# Patient Record
Sex: MALE | Race: WHITE
[De-identification: names, ages, dates, MRNs, and addresses within clinical notes are randomized per-mention and may not be internally consistent; named-entity substitution may affect disease eponyms.]

---

## 2021-05-28 ENCOUNTER — HOSPITAL ENCOUNTER (INPATIENT)
Dept: HOSPITAL 47 - EC | Age: 66
LOS: 6 days | Discharge: HOSPICE | DRG: 178 | End: 2021-06-03
Attending: HOSPITALIST | Admitting: HOSPITALIST
Payer: MEDICARE

## 2021-05-28 VITALS — BODY MASS INDEX: 23.6 KG/M2

## 2021-05-28 DIAGNOSIS — E03.9: ICD-10-CM

## 2021-05-28 DIAGNOSIS — E87.1: ICD-10-CM

## 2021-05-28 DIAGNOSIS — F02.80: ICD-10-CM

## 2021-05-28 DIAGNOSIS — F20.9: ICD-10-CM

## 2021-05-28 DIAGNOSIS — E87.2: ICD-10-CM

## 2021-05-28 DIAGNOSIS — Z51.5: ICD-10-CM

## 2021-05-28 DIAGNOSIS — E86.0: ICD-10-CM

## 2021-05-28 DIAGNOSIS — G40.909: ICD-10-CM

## 2021-05-28 DIAGNOSIS — D64.9: ICD-10-CM

## 2021-05-28 DIAGNOSIS — G20: ICD-10-CM

## 2021-05-28 DIAGNOSIS — U07.1: Primary | ICD-10-CM

## 2021-05-28 LAB
ALBUMIN SERPL-MCNC: 3.5 G/DL (ref 3.8–4.9)
ALBUMIN/GLOB SERPL: 1.67 G/DL (ref 1.6–3.17)
ALP SERPL-CCNC: 89 U/L (ref 41–126)
ALT SERPL-CCNC: 24 U/L (ref 10–49)
ANION GAP SERPL CALC-SCNC: 8.8 MMOL/L (ref 4–12)
AST SERPL-CCNC: 37 U/L (ref 14–35)
BASOPHILS # BLD AUTO: 0.01 X 10*3/UL (ref 0–0.1)
BASOPHILS NFR BLD AUTO: 0.3 %
BUN SERPL-SCNC: 8 MG/DL (ref 9–27)
BUN/CREAT SERPL: 11.43 RATIO (ref 12–20)
CALCIUM SPEC-MCNC: 8.4 MG/DL (ref 8.7–10.3)
CHLORIDE SERPL-SCNC: 106 MMOL/L (ref 96–109)
CO2 SERPL-SCNC: 23.2 MMOL/L (ref 21.6–31.8)
EOSINOPHIL # BLD AUTO: 0.01 X 10*3/UL (ref 0.04–0.35)
EOSINOPHIL NFR BLD AUTO: 0.3 %
ERYTHROCYTE [DISTWIDTH] IN BLOOD BY AUTOMATED COUNT: 3.89 X 10*6/UL (ref 4.4–5.6)
ERYTHROCYTE [DISTWIDTH] IN BLOOD: 12.6 % (ref 11.5–14.5)
GLOBULIN SER CALC-MCNC: 2.1 G/DL (ref 1.6–3.3)
GLUCOSE SERPL-MCNC: 99 MG/DL (ref 70–110)
HCT VFR BLD AUTO: 35.9 % (ref 39.6–50)
HGB BLD-MCNC: 11.6 G/DL (ref 13–17)
LYMPHOCYTES # SPEC AUTO: 1.06 X 10*3/UL (ref 0.9–5)
LYMPHOCYTES NFR SPEC AUTO: 33.1 %
MCH RBC QN AUTO: 29.8 PG (ref 27–32)
MCHC RBC AUTO-ENTMCNC: 32.3 G/DL (ref 32–37)
MCV RBC AUTO: 92.3 FL (ref 80–97)
MONOCYTES # BLD AUTO: 0.4 X 10*3/UL (ref 0.2–1)
MONOCYTES NFR BLD AUTO: 12.5 %
NEUTROPHILS # BLD AUTO: 1.71 X 10*3/UL (ref 1.8–7.7)
NEUTROPHILS NFR BLD AUTO: 53.5 %
PLATELET # BLD AUTO: 149 X 10*3/UL (ref 140–440)
POTASSIUM SERPL-SCNC: 4.2 MMOL/L (ref 3.5–5.5)
PROT SERPL-MCNC: 5.6 G/DL (ref 6.2–8.2)
SODIUM SERPL-SCNC: 138 MMOL/L (ref 135–145)
WBC # BLD AUTO: 3.2 X 10*3/UL (ref 4.5–10)

## 2021-05-28 PROCEDURE — 99285 EMERGENCY DEPT VISIT HI MDM: CPT

## 2021-05-28 PROCEDURE — 80048 BASIC METABOLIC PNL TOTAL CA: CPT

## 2021-05-28 PROCEDURE — 85025 COMPLETE CBC W/AUTO DIFF WBC: CPT

## 2021-05-28 PROCEDURE — 86140 C-REACTIVE PROTEIN: CPT

## 2021-05-28 PROCEDURE — 83615 LACTATE (LD) (LDH) ENZYME: CPT

## 2021-05-28 PROCEDURE — 87635 SARS-COV-2 COVID-19 AMP PRB: CPT

## 2021-05-28 PROCEDURE — 70450 CT HEAD/BRAIN W/O DYE: CPT

## 2021-05-28 PROCEDURE — 85027 COMPLETE CBC AUTOMATED: CPT

## 2021-05-28 PROCEDURE — 80053 COMPREHEN METABOLIC PANEL: CPT

## 2021-05-28 PROCEDURE — 85379 FIBRIN DEGRADATION QUANT: CPT

## 2021-05-28 RX ADMIN — FAMOTIDINE SCH MG: 20 TABLET, FILM COATED ORAL at 20:25

## 2021-05-28 RX ADMIN — FAMOTIDINE SCH MG: 20 TABLET, FILM COATED ORAL at 09:20

## 2021-05-28 RX ADMIN — LACOSAMIDE SCH MG: 50 TABLET, FILM COATED ORAL at 09:20

## 2021-05-28 RX ADMIN — LACOSAMIDE SCH MG: 50 TABLET, FILM COATED ORAL at 20:25

## 2021-05-28 RX ADMIN — LEVOTHYROXINE SODIUM SCH MCG: 50 TABLET ORAL at 09:22

## 2021-05-28 NOTE — P.HPIM
History of Present Illness


H&P Date: 05/28/21


Chief Complaint: Generalized weakness





65-year-old male with seizures, Parkinson disease, schizophrenia and dementia





Patient is adult foster care house resident, he was found to have decreased by 

mouth intake over the past couple days with increased generalized weakness at b

aseline he is able to stand up and walk without assistance over the past 2 days 

he is requiring increased assistance and today he was requiring 2 person 

assistance this is per the [facility] house definitely a change from his baseline no 

report of fevers or chills no reports of trouble breathing or upper respiratory 

infection symptoms no report of GI bleeding or changes in bowel or urinary 

habits however they are reporting decreased by mouth intake, no report of recent

seizure activity





Patient was sent to [facility] where he was evaluated found to have 

hyponatremia, lactic acidosis was within normal limits, pro calcitonin was 

slightly elevated at 0.12, he tested positive for coated and he was also found 

to have mild anemia.  Chest x-ray showed no acute pathology soft for chronic 

changes, CT of the head showed chronic changes no acute pathology, EKG showed 

normal sinus rhythm





Patient was transferred to our facility for further care as the adult foster 

care house refuses to take him back due to testing positive for covid





Patient otherwise unable to provide any meaningful history at this time he only 

mumbles incomprehensible words





Review of Systems


ROS unobtainable: due to mental status





Past Medical History


Past Medical History: Dementia, Seizure Disorder


Additional Past Medical History / Comment(s): parkinson,


History of Any Multi-Drug Resistant Organisms: None Reported


Past Surgical History: Orthopedic Surgery


Past Psychological History: Unable to Obtain


Smoking Status: Unknown if ever smoked


Past Alcohol Use History: None Reported


Past Drug Use History: None Reported





Medications and Allergies


                                    Allergies











Allergy/AdvReac Type Severity Reaction Status Date / Time


 


No Known Allergies Allergy   Verified 05/28/21 00:36














Physical Exam


Vitals: 


                                   Vital Signs











  Temp Pulse Resp BP Pulse Ox


 


 05/28/21 00:25  97.7 F  54 L  16  109/72  100








                                Intake and Output











 05/27/21 05/27/21 05/28/21





 14:59 22:59 06:59


 


Other:   


 


  Weight   66.224 kg














Constitutional:          No acute distress, pleasant and cooperative however he 

only mumbles incomprehensible words


Eyes:      Anicteric sclerae, moist conjunctiva, 


         Pupils equal round reactive to light





ENMT:      NC/AT


         Oropharynx clear, no erythema, or exudates





Neck:      Supple, FROM, no masses, or JVD


         No carotid bruits


         No thyromegaly





Lungs:      Clear to auscultation


         Clear to percussion


         Normal respiratory effort, no accessory muscle use 





Cardiovascular:      Heart regular in rate and rhythm, 


         No murmurs, gallops, or rubs


         No peripheral edema





Abdominal:       Soft


         Nontender, no guarding, rebound or rigidity


         Abdomen moving with respiration


         Normoactive bowel sounds


         No hepatomegaly, No splenomegaly


         No palpable mass 


         No abdominal wall hernia noted 





Skin:      Normal temperature, tone, texture, turgor


         No induration


         No subcutaneous nodules 


         No rash, lesions


         No ulcers





Extremities:      No digital cyanosis 


         No clubbing


         Pedal pulses intact and symmetrical


         Radial pulses intact and symmetrical 


         No calf tenderness 





Psychiatric:      Alert and oriented to person, place  


          


      


Neuro      Muscles Strength 5/5 in all 4 extremities 


         Sensation to light touch grossly present throughout


         Cranial nerves II-XII grossly intact


         No focal sensory deficits


Lymphatics:       no palpable cervical or supraclavicular , or inguinal lymph 

nodes  





Assessment and Plan


Assessment: 





Hyponatremia and dehydration secondary to decreased by mouth intake


\History of seizure


Covid positive


Advanced dementia and Parkinson


Mild anemia no reported GI bleeding





Plan


IV fluid hydration with normal saline


Monitor electrolytes


Fall and seizure precautions


Supportive care


Supplemental oxygen if needed


Monitor hemoglobin








Verify home medications





PT/OT eval


 


CODE STATUS: Full code


DVT prophylaxis: Heparin subcu 3 times a day


Discussed with: Patient, ER


Anticipated length of stay  < than 2 midnights


Anticipated discharge place: [facility]


A total of 60 minutes was spent on the care of this complex patient more than 

50% of the time was spent in counseling and care coordination.

## 2021-05-28 NOTE — P.PN
Progress Note - Text


Progress Note Date: 05/28/21





Patient seen and evaluated independently by me today, I agree with the 

assessment and plan as documented by my colleague earlier today with no changes.

## 2021-05-28 NOTE — ED
Weakness HPI





- General


Stated complaint: Covid


Time Seen by Provider: 05/28/21 00:30


Source: patient, EMS


Mode of arrival: EMS


Limitations: no limitations





- History of Present Illness


Initial comments: 





This patient is a 65-year-old man who is transferred here from [facility].  Patient had been transferred there from his long-term care facility 

as he was weaker than usual.  They worked the patient up there he was found to 

have tested positive for covid 19 infection.  He also was hyponatremic with a 

sodium 129.  When I interview the patient, no complaints of pain or dyspnea.


MD Complaint: generalized weakness, lack of energy, difficulty walking


-: days(s)


Location: generalized


Improves with: none


Worsens with: none





- Related Data


                                    Allergies











Allergy/AdvReac Type Severity Reaction Status Date / Time


 


No Known Allergies Allergy   Verified 05/28/21 00:36














Review of Systems


ROS Statement: 


Those systems with pertinent positive or pertinent negative responses have been 

documented in the HPI.





ROS Other: All systems not noted in ROS Statement are negative.


Constitutional: Denies: fever


Respiratory: Denies: cough, dyspnea


Cardiovascular: Denies: chest pain


Gastrointestinal: Denies: abdominal pain, vomiting, diarrhea


Genitourinary: Denies: dysuria


Musculoskeletal: Denies: back pain


Skin: Denies: rash


Neurological: Denies: headache, weakness





Past Medical History


Past Medical History: Dementia, Seizure Disorder


Additional Past Medical History / Comment(s): parkinson,


History of Any Multi-Drug Resistant Organisms: None Reported


Past Surgical History: Orthopedic Surgery


Past Psychological History: Unable to Obtain


Smoking Status: Unknown if ever smoked


Past Alcohol Use History: None Reported


Past Drug Use History: None Reported





General Exam


Limitations: no limitations


General appearance: alert, in no apparent distress


Head exam: Present: atraumatic, normocephalic


Eye exam: Present: normal appearance.  Absent: scleral icterus, conjunctival 

injection


ENT exam: Present: mucous membranes dry


Neck exam: Present: normal inspection


Respiratory exam: Present: rales.  Absent: respiratory distress, wheezes, 

rhonchi, stridor, chest wall tenderness, accessory muscle use, decreased breath 

sounds, prolonged expiratory


Cardiovascular Exam: Present: bradycardia, normal heart sounds.  Absent: systo

lic murmur, diastolic murmur, rubs, gallop


GI/Abdominal exam: Present: soft.  Absent: distended, tenderness, guarding, 

rebound, rigid, mass


Extremities exam: Present: normal inspection, normal capillary refill.  Absent: 

pedal edema, calf tenderness


Back exam: Present: normal inspection


Neurological exam: Present: alert.  Absent: motor sensory deficit


Skin exam: Present: warm, dry, intact, normal color.  Absent: rash





Course


                                   Vital Signs











  05/28/21





  00:25


 


Temperature 97.7 F


 


Pulse Rate 54 L


 


Respiratory 16





Rate 


 


Blood Pressure 109/72


 


O2 Sat by Pulse 100





Oximetry 














Disposition


Clinical Impression: 


 COVID-19, Hyponatremia, Generalized weakness





Disposition: ADMITTED AS IP TO THIS HOSP


Condition: Fair


Is patient prescribed a controlled substance at d/c from ED?: No


Referrals: 


Mark Major MD [Primary Care Provider] - 1-2 days

## 2021-05-29 LAB
ANION GAP SERPL CALC-SCNC: 3 MMOL/L
BASOPHILS # BLD AUTO: 0 K/UL (ref 0–0.2)
BASOPHILS NFR BLD AUTO: 0 %
BUN SERPL-SCNC: 11 MG/DL (ref 9–20)
CALCIUM SPEC-MCNC: 8.4 MG/DL (ref 8.4–10.2)
CHLORIDE SERPL-SCNC: 99 MMOL/L (ref 98–107)
CO2 SERPL-SCNC: 30 MMOL/L (ref 22–30)
EOSINOPHIL # BLD AUTO: 0 K/UL (ref 0–0.7)
EOSINOPHIL NFR BLD AUTO: 0 %
ERYTHROCYTE [DISTWIDTH] IN BLOOD BY AUTOMATED COUNT: 3.95 M/UL (ref 4.3–5.9)
ERYTHROCYTE [DISTWIDTH] IN BLOOD: 12.5 % (ref 11.5–15.5)
GLUCOSE SERPL-MCNC: 91 MG/DL (ref 74–99)
HCT VFR BLD AUTO: 35.2 % (ref 39–53)
HGB BLD-MCNC: 11.9 GM/DL (ref 13–17.5)
LYMPHOCYTES # SPEC AUTO: 0.8 K/UL (ref 1–4.8)
LYMPHOCYTES NFR SPEC AUTO: 25 %
MCH RBC QN AUTO: 30.1 PG (ref 25–35)
MCHC RBC AUTO-ENTMCNC: 33.7 G/DL (ref 31–37)
MCV RBC AUTO: 89.2 FL (ref 80–100)
MONOCYTES # BLD AUTO: 0.3 K/UL (ref 0–1)
MONOCYTES NFR BLD AUTO: 10 %
NEUTROPHILS # BLD AUTO: 2 K/UL (ref 1.3–7.7)
NEUTROPHILS NFR BLD AUTO: 63 %
PLATELET # BLD AUTO: 171 K/UL (ref 150–450)
POTASSIUM SERPL-SCNC: 4.4 MMOL/L (ref 3.5–5.1)
SODIUM SERPL-SCNC: 132 MMOL/L (ref 137–145)
WBC # BLD AUTO: 3.1 K/UL (ref 3.8–10.6)

## 2021-05-29 RX ADMIN — BETHANECHOL CHLORIDE SCH MG: 10 TABLET ORAL at 16:11

## 2021-05-29 RX ADMIN — LACOSAMIDE SCH MG: 50 TABLET, FILM COATED ORAL at 10:06

## 2021-05-29 RX ADMIN — LACOSAMIDE SCH MG: 50 TABLET, FILM COATED ORAL at 20:46

## 2021-05-29 RX ADMIN — FAMOTIDINE SCH MG: 20 TABLET, FILM COATED ORAL at 10:06

## 2021-05-29 RX ADMIN — BETHANECHOL CHLORIDE SCH MG: 10 TABLET ORAL at 20:45

## 2021-05-29 RX ADMIN — FAMOTIDINE SCH MG: 20 TABLET, FILM COATED ORAL at 20:45

## 2021-05-29 RX ADMIN — Medication SCH MG: at 20:46

## 2021-05-29 RX ADMIN — LEVOTHYROXINE SODIUM SCH MCG: 50 TABLET ORAL at 06:11

## 2021-05-29 RX ADMIN — BENZTROPINE MESYLATE SCH MG: 1 TABLET ORAL at 20:45

## 2021-05-29 NOTE — P.PN
Subjective


Progress Note Date: 05/29/21





No new complaints today, pending discharge.





Objective





- Vital Signs


Vital signs: 


                                   Vital Signs











Temp  98.4 F   05/29/21 10:00


 


Pulse  95   05/29/21 10:00


 


Resp  16   05/29/21 10:00


 


BP  104/65   05/29/21 10:00


 


Pulse Ox  98   05/29/21 10:00








                                 Intake & Output











 05/28/21 05/29/21 05/29/21





 18:59 06:59 18:59


 


Output Total 300 300 


 


Balance -300 -300 


 


Output:   


 


  Urine 300 300 


 


Other:   


 


  Voiding Method Diaper Diaper Diaper





 Incontinent Incontinent Incontinent


 


  # Voids 1 1 














- Exam





Gen: awake, alert


HEENT: normocephalic, atraumatic, good hearing acuity, moist mucous membranes


Resp: good air exchange, breathing comfortably with no accessory muscle use


CVS: good distal perfusion x 4,


GI: soft, NTTP, ND


: no SPT, no CVAT, kwon catheter not present


MSK: no pitting edema, no clubbing


Neuro: non-focal, moving all extremities


Psych: cooperative, euthymic mood





- Labs


CBC & Chem 7: 


                                 05/29/21 08:20





                                 05/29/21 08:20


Labs: 


                  Abnormal Lab Results - Last 24 Hours (Table)











  05/28/21 05/29/21 05/29/21 Range/Units





  18:00 08:20 08:20 


 


WBC   3.1 L   (3.8-10.6)  k/uL


 


RBC   3.95 L   (4.30-5.90)  m/uL


 


Hgb   11.9 L   (13.0-17.5)  gm/dL


 


Hct   35.2 L   (39.0-53.0)  %


 


Lymphocytes #   0.8 L   (1.0-4.8)  k/uL


 


Sodium    132 L  (137-145)  mmol/L


 


Coronavirus (PCR)  Detected A    (Not Detectd)  














Assessment and Plan


Assessment: 





Hyponatremia and dehydration secondary to decreased by mouth intake


\History of seizure


Covid positive


Advanced dementia and Parkinson


Mild anemia no reported GI bleeding





Plan


IV fluid hydration with normal saline


Monitor electrolytes


Fall and seizure precautions


Supportive care


Supplemental oxygen if needed


Monitor hemoglobin


Plan is for patient to return to [facility] home with hospice.








Home meds reviewed and reconciled.





PT/OT eval


 


CODE STATUS: Full code


DVT prophylaxis: Heparin subcu 3 times a day


Discussed with: Patient, ER


Anticipated length of stay  < than 2 midnights


Anticipated discharge place: [facility]

## 2021-05-30 RX ADMIN — Medication SCH MG: at 21:13

## 2021-05-30 RX ADMIN — BENZTROPINE MESYLATE SCH MG: 1 TABLET ORAL at 21:12

## 2021-05-30 RX ADMIN — BETHANECHOL CHLORIDE SCH MG: 10 TABLET ORAL at 10:06

## 2021-05-30 RX ADMIN — FAMOTIDINE SCH MG: 20 TABLET, FILM COATED ORAL at 21:13

## 2021-05-30 RX ADMIN — BENZTROPINE MESYLATE SCH MG: 1 TABLET ORAL at 10:07

## 2021-05-30 RX ADMIN — BETHANECHOL CHLORIDE SCH MG: 10 TABLET ORAL at 21:13

## 2021-05-30 RX ADMIN — LEVOTHYROXINE SODIUM SCH MCG: 50 TABLET ORAL at 10:05

## 2021-05-30 RX ADMIN — FAMOTIDINE SCH MG: 20 TABLET, FILM COATED ORAL at 10:06

## 2021-05-30 RX ADMIN — TAMSULOSIN HYDROCHLORIDE SCH MG: 0.4 CAPSULE ORAL at 10:06

## 2021-05-30 RX ADMIN — BETHANECHOL CHLORIDE SCH MG: 10 TABLET ORAL at 15:32

## 2021-05-30 RX ADMIN — THERA TABS SCH EACH: TAB at 10:06

## 2021-05-30 RX ADMIN — LACOSAMIDE SCH MG: 50 TABLET, FILM COATED ORAL at 10:05

## 2021-05-30 RX ADMIN — Medication SCH MCG: at 10:06

## 2021-05-30 RX ADMIN — LACOSAMIDE SCH MG: 50 TABLET, FILM COATED ORAL at 21:12

## 2021-05-30 NOTE — P.PN
Subjective


Progress Note Date: 05/30/21





No new complaints.  Discharge pending.





Objective





- Vital Signs


Vital signs: 


                                   Vital Signs











Temp  97.5 F L  05/30/21 10:00


 


Pulse  65   05/30/21 10:00


 


Resp  20   05/30/21 10:00


 


BP  126/68   05/30/21 10:00


 


Pulse Ox  95   05/30/21 10:00








                                 Intake & Output











 05/29/21 05/30/21 05/30/21





 18:59 06:59 18:59


 


Intake Total 1080  


 


Balance 1080  


 


Intake:   


 


  Oral 1080  


 


Other:   


 


  Voiding Method Diaper Diaper Diaper





 Incontinent Incontinent Incontinent


 


  # Voids 2 3 














- Exam





Gen: awake, alert


HEENT: normocephalic, atraumatic, good hearing acuity, moist mucous membranes


Resp: good air exchange, breathing comfortably with no accessory muscle use


CVS: good distal perfusion x 4,


GI: soft, NTTP, ND


: no SPT, no CVAT, kwon catheter not present


MSK: no pitting edema, no clubbing


Neuro: non-focal, moving all extremities


Psych: cooperative, euthymic mood 





- Labs


CBC & Chem 7: 


                                 05/29/21 08:20





                                 05/29/21 08:20





Assessment and Plan


Assessment: 





Hyponatremia and dehydration secondary to decreased by mouth intake


History of seizure


Covid positive


Advanced dementia and Parkinson


Mild anemia no reported GI bleeding





Plan


IV fluid hydration with normal saline --> PO intake


Monitor electrolytes daily


Fall and seizure precautions


Supportive care


Supplemental oxygen if needed


Monitor hemoglobin


Plan is for patient to return to [facility] with hospice.








Home meds reviewed and reconciled.





PT/OT eval


 


CODE STATUS: Full code


DVT prophylaxis: Heparin subcu 3 times a day


Discussed with: Patient, ER


Anticipated length of stay  < than 2 midnights


Anticipated discharge place: [facility] with hospice

## 2021-05-31 LAB
ALBUMIN SERPL-MCNC: 3.2 G/DL (ref 3.8–4.9)
ALBUMIN/GLOB SERPL: 1.52 G/DL (ref 1.6–3.17)
ALP SERPL-CCNC: 84 U/L (ref 41–126)
ALT SERPL-CCNC: 35 U/L (ref 10–49)
ANION GAP SERPL CALC-SCNC: 5.2 MMOL/L (ref 4–12)
AST SERPL-CCNC: 39 U/L (ref 14–35)
BUN SERPL-SCNC: 18 MG/DL (ref 9–27)
BUN/CREAT SERPL: 25.71 RATIO (ref 12–20)
CALCIUM SPEC-MCNC: 8.4 MG/DL (ref 8.7–10.3)
CHLORIDE SERPL-SCNC: 101 MMOL/L (ref 96–109)
CO2 SERPL-SCNC: 30.8 MMOL/L (ref 21.6–31.8)
ERYTHROCYTE [DISTWIDTH] IN BLOOD BY AUTOMATED COUNT: 3.47 X 10*6/UL (ref 4.4–5.6)
ERYTHROCYTE [DISTWIDTH] IN BLOOD: 12.6 % (ref 11.5–14.5)
GLOBULIN SER CALC-MCNC: 2.1 G/DL (ref 1.6–3.3)
GLUCOSE SERPL-MCNC: 125 MG/DL (ref 70–110)
HCT VFR BLD AUTO: 31.5 % (ref 39.6–50)
HGB BLD-MCNC: 10.4 G/DL (ref 13–17)
MCH RBC QN AUTO: 30 PG (ref 27–32)
MCHC RBC AUTO-ENTMCNC: 33 G/DL (ref 32–37)
MCV RBC AUTO: 90.8 FL (ref 80–97)
PLATELET # BLD AUTO: 192 X 10*3/UL (ref 140–440)
POTASSIUM SERPL-SCNC: 4.1 MMOL/L (ref 3.5–5.5)
PROT SERPL-MCNC: 5.3 G/DL (ref 6.2–8.2)
SODIUM SERPL-SCNC: 137 MMOL/L (ref 135–145)
WBC # BLD AUTO: 13.46 X 10*3/UL (ref 4.5–10)

## 2021-05-31 RX ADMIN — Medication SCH MG: at 21:11

## 2021-05-31 RX ADMIN — LACOSAMIDE SCH MG: 50 TABLET, FILM COATED ORAL at 21:12

## 2021-05-31 RX ADMIN — Medication SCH MCG: at 08:21

## 2021-05-31 RX ADMIN — BETHANECHOL CHLORIDE SCH MG: 10 TABLET ORAL at 21:12

## 2021-05-31 RX ADMIN — BENZTROPINE MESYLATE SCH MG: 1 TABLET ORAL at 21:11

## 2021-05-31 RX ADMIN — THERA TABS SCH EACH: TAB at 08:21

## 2021-05-31 RX ADMIN — BETHANECHOL CHLORIDE SCH MG: 10 TABLET ORAL at 09:35

## 2021-05-31 RX ADMIN — BENZTROPINE MESYLATE SCH MG: 1 TABLET ORAL at 09:35

## 2021-05-31 RX ADMIN — LACOSAMIDE SCH MG: 50 TABLET, FILM COATED ORAL at 08:22

## 2021-05-31 RX ADMIN — BETHANECHOL CHLORIDE SCH MG: 10 TABLET ORAL at 17:27

## 2021-05-31 RX ADMIN — FAMOTIDINE SCH MG: 20 TABLET, FILM COATED ORAL at 08:22

## 2021-05-31 RX ADMIN — FAMOTIDINE SCH MG: 20 TABLET, FILM COATED ORAL at 21:11

## 2021-05-31 RX ADMIN — TAMSULOSIN HYDROCHLORIDE SCH MG: 0.4 CAPSULE ORAL at 08:22

## 2021-05-31 RX ADMIN — LEVOTHYROXINE SODIUM SCH MCG: 50 TABLET ORAL at 08:22

## 2021-05-31 NOTE — P.PN
Subjective


Progress Note Date: 05/31/21 (delayed charting seen at 0940)


Principal diagnosis: 


fatigue





Patient is a 65 showed male with a history of schizophrenia, dementia, 

Parkinson's, and seizure disorder who presented as a transfer from [facility] secondary to COVID-19 infection.  He initially presented there 

secondary to poor oral intake and lethargy.  He was found to be COVID-19 

positive, hyponatremic, and had lactic acidosis. His CXR showed no acute 

process. CT ea showed no acute pathology.  He was transferred here and started 

on IVF. His legal guardian has decided on hospice care and awaiting transfer to 

[facility]. 





Patient seen and examined at bedside.  His speech is very hard to comprehend but

he does answer yes to pain and points toward his hip.  He follows simple 

commands such as taking a deep breath.





General:  no distress, appears older than stated age, thin, temporal wasting


Derm: warm, dry


Head: atraumatic, normocephalic, symmetric


Eyes: EOMI, no lid lag, anicteric sclera


Mouth: no lip lesion, mucus membranes moist


Cardiovascular: S1S2 reg, no murmur, positive posterior tibial pulse bilateral, 


Lungs: Decreased bs bilateral , no accessory muscle use


Abdominal: soft,  nontender to palpation, no guarding, no appreciable 

organomegaly


Ext: no gross muscle atrophy, no edema, no contractures


Neuro: no tremors, moving all 4 extremities idependently 


Psych: awakre, alert to self 





COVID-19 positive


- Patient has not been hypoxic and therefore does not meet criteria for 

dexamethasone, Remdesivir, or other treatments.


- Lovenox, Vit D 





Schizophrenia with extra paramadial side effects and dementia


- thorazine, Cogentin, vistaril





Sizure disorder


- vimpat, laictal 





Hypothyroidism


- synthroid 





Hyponatremia, dehydration-improved


Fall








DVT prophylaxis: Lovenox


Discussed with: patient,nursing


Anticipated discharge: in AM


Anticipated discharge place: [facility]osp[facility]


A total of 35 minutes was spent on the care of this complex patient more than 

50% of the time was spent in counseling and care coordination. 








Objective





- Vital Signs


Vital signs: 


                                   Vital Signs











Temp  99.7 F H  05/31/21 14:00


 


Pulse  95   05/31/21 14:00


 


Resp  16   05/31/21 14:00


 


BP  104/63   05/31/21 14:00


 


Pulse Ox  95   05/31/21 14:00








                                 Intake & Output











 05/30/21 05/31/21 05/31/21





 18:59 06:59 18:59


 


Output Total 200  


 


Balance -200  


 


Output:   


 


  Urine 200  


 


Other:   


 


  Voiding Method Diaper Diaper Diaper





 Incontinent Incontinent Incontinent


 


  # Voids 2 1 














- Labs


CBC & Chem 7: 


                                 05/29/21 08:20





                                 05/29/21 08:20

## 2021-06-01 LAB — LDH SPEC-CCNC: 223 U/L (ref 120–246)

## 2021-06-01 RX ADMIN — BETHANECHOL CHLORIDE SCH: 10 TABLET ORAL at 17:27

## 2021-06-01 RX ADMIN — Medication SCH MG: at 23:09

## 2021-06-01 RX ADMIN — BENZTROPINE MESYLATE SCH MG: 1 TABLET ORAL at 09:15

## 2021-06-01 RX ADMIN — FAMOTIDINE SCH MG: 20 TABLET, FILM COATED ORAL at 23:10

## 2021-06-01 RX ADMIN — LEVOTHYROXINE SODIUM SCH MCG: 50 TABLET ORAL at 09:15

## 2021-06-01 RX ADMIN — FAMOTIDINE SCH MG: 20 TABLET, FILM COATED ORAL at 09:14

## 2021-06-01 RX ADMIN — LACOSAMIDE SCH MG: 50 TABLET, FILM COATED ORAL at 23:11

## 2021-06-01 RX ADMIN — BENZTROPINE MESYLATE SCH MG: 1 TABLET ORAL at 23:12

## 2021-06-01 RX ADMIN — BETHANECHOL CHLORIDE SCH MG: 10 TABLET ORAL at 09:15

## 2021-06-01 RX ADMIN — TAMSULOSIN HYDROCHLORIDE SCH MG: 0.4 CAPSULE ORAL at 09:15

## 2021-06-01 RX ADMIN — THERA TABS SCH EACH: TAB at 09:14

## 2021-06-01 RX ADMIN — LACOSAMIDE SCH MG: 50 TABLET, FILM COATED ORAL at 09:14

## 2021-06-01 RX ADMIN — BENZTROPINE MESYLATE SCH: 1 TABLET ORAL at 17:28

## 2021-06-01 RX ADMIN — ENOXAPARIN SODIUM SCH: 40 INJECTION SUBCUTANEOUS at 09:16

## 2021-06-01 RX ADMIN — BETHANECHOL CHLORIDE SCH MG: 10 TABLET ORAL at 17:24

## 2021-06-01 RX ADMIN — Medication SCH MCG: at 09:15

## 2021-06-01 NOTE — P.PN
Subjective


Progress Note Date: 06/01/21 (delayed charting seen at 0730)


Principal diagnosis: 


fatigue





Patient is a 65 showed male with a history of schizophrenia, dementia, 

Parkinson's, and seizure disorder who presented as a transfer from [facility] secondary to COVID-19 infection.  He initially presented there 

secondary to poor oral intake and lethargy.  He was found to be COVID-19 

positive, hyponatremic, and had lactic acidosis. His CXR showed no acute 

process. CT ea showed no acute pathology.  He was transferred here and started 

on IVF. His legal guardian has decided on hospice care and awaiting transfer to 

hospice [place]. 





Patient seen and examined at bedside. Denies pain, lifts arms to indicate that 

he wants to be left alone and no longer interacts. 





General:  no distress, appears older than stated age, thin, temporal wasting


Derm: warm, dry


Head: atraumatic, normocephalic, symmetric


Eyes: EOMI, no lid lag, anicteric sclera


Mouth: no lip lesion, mucus membranes moist


Cardiovascular: S1S2 reg, no murmur, positive posterior tibial pulse bilateral, 


Lungs: Decreased bs bilateral , no accessory muscle use


Abdominal: soft,  nontender to palpation, no guarding, no appreciable organome

carlitos


Ext: no gross muscle atrophy, no edema, no contractures


Neuro: no tremors, moving all 4 extremities idependently 


Psych: sleeping, alert to self 





COVID-19 positive


- Patient has not been hypoxic and therefore does not meet criteria for 

dexamethasone, Remdesivir, or other treatments.


- Lovenox, Vit D 





Schizophrenia with extra paramadial side effects and dementia


- thorazine, Cogentin, vistaril





Sizure disorder


- vimpat, laictal 





Hypothyroidism


- synthroid 





Hyponatremia, dehydration-improved


Fall








DVT prophylaxis: Lovenox


Discussed with: patient,nursing


Anticipated discharge:once bed available. 


Anticipated discharge place: Hospice House


A total of 35 minutes was spent on the care of this complex patient more than 

50% of the time was spent in counseling and care coordination. 








Objective





- Vital Signs


Vital signs: 


                                   Vital Signs











Temp  98 F   06/01/21 14:28


 


Pulse  97   06/01/21 14:28


 


Resp  14   06/01/21 14:28


 


BP  94/60   06/01/21 14:28


 


Pulse Ox  95   06/01/21 14:28








                                 Intake & Output











 05/31/21 06/01/21 06/01/21





 18:59 06:59 18:59


 


Other:   


 


  Voiding Method Diaper Diaper Diaper





 Incontinent Incontinent Incontinent


 


  # Voids 3 2 














- Labs


CBC & Chem 7: 


                                 05/31/21 17:01





                                 05/31/21 17:01


Labs: 


                  Abnormal Lab Results - Last 24 Hours (Table)











  05/31/21 05/31/21 05/31/21 Range/Units





  17:01 17:01 17:01 


 


WBC  13.46 H    (4.50-10.00)  X 10*3/uL


 


RBC  3.47 L    (4.40-5.60)  X 10*6/uL


 


Hgb  10.4 L    (13.0-17.0)  g/dL


 


Hct  31.5 L    (39.6-50.0)  %


 


MPV  9.4 L    (9.5-12.2)  fL


 


D-Dimer   0.69 H   (<0.60)  mg/L FEU


 


BUN/Creatinine Ratio    25.71 H  (12.00-20.00)  Ratio


 


Glucose    125 H  ()  mg/dL


 


Calcium    8.4 L  (8.7-10.3)  mg/dL


 


AST    39 H  (14-35)  U/L


 


C-Reactive Protein    4.5 H  (0.0-0.8)  mg/dL


 


Total Protein    5.3 L  (6.2-8.2)  g/dL


 


Albumin    3.20 L  (3.80-4.90)  g/dL


 


Albumin/Globulin Ratio    1.52 L  (1.60-3.17)  g/dL

## 2021-06-01 NOTE — CT
EXAMINATION TYPE: CT brain wo con

 

DATE OF EXAM: 6/1/2021

 

COMPARISON: 5/27/2021

 

INDICATION: Fall, change in mental status, r/o bleed. Pt very combative, meds not administered becaus
e of fall. Would not stay still despite velcro seatbelts and verbal commands. Had to attempt 2 scans.
 Images prior to recon sent in error. Reconed images done

 

DLP: 2902.4 mGycm, Automated exposure control for dose reduction was used.

 

CONTRAST: None

 

CT of the brain is performed utilizing 3 mm thick sections through the posterior fossa and 3 mm thick
 sections through the remaining calvarium.  Study is performed within 24 hours of arrival to the hosp
ital. 

 

Exam is extremely limited due to the combative nature of the patient. Best possible reconstructed sarah
ges are presented.

 

No abnormal hyperdensity is present to suggest an acute intracranial hemorrhage.

No mass lesion is evident.

No acute infarcts are evident.

Ventricles and sulci are prominent for the patient age.  

Paranasal sinuses and mastoid air cells within the field-of-view are clear.

 

IMPRESSIONS:

1.   No obvious acute intracranial abnormality is identified. However, examination is very limited du
e to the combative nature of the patient at the time of this examination.

## 2021-06-02 RX ADMIN — FAMOTIDINE SCH MG: 20 TABLET, FILM COATED ORAL at 09:38

## 2021-06-02 RX ADMIN — TAMSULOSIN HYDROCHLORIDE SCH MG: 0.4 CAPSULE ORAL at 09:41

## 2021-06-02 RX ADMIN — BETHANECHOL CHLORIDE SCH: 10 TABLET ORAL at 02:39

## 2021-06-02 RX ADMIN — BETHANECHOL CHLORIDE SCH: 10 TABLET ORAL at 16:13

## 2021-06-02 RX ADMIN — THERA TABS SCH EACH: TAB at 09:41

## 2021-06-02 RX ADMIN — Medication SCH MG: at 20:01

## 2021-06-02 RX ADMIN — LACOSAMIDE SCH MG: 50 TABLET, FILM COATED ORAL at 20:01

## 2021-06-02 RX ADMIN — BETHANECHOL CHLORIDE SCH MG: 10 TABLET ORAL at 09:38

## 2021-06-02 RX ADMIN — FAMOTIDINE SCH MG: 20 TABLET, FILM COATED ORAL at 20:00

## 2021-06-02 RX ADMIN — Medication SCH MCG: at 09:38

## 2021-06-02 RX ADMIN — LACOSAMIDE SCH MG: 50 TABLET, FILM COATED ORAL at 09:38

## 2021-06-02 RX ADMIN — BETHANECHOL CHLORIDE SCH MG: 10 TABLET ORAL at 20:01

## 2021-06-02 RX ADMIN — BENZTROPINE MESYLATE SCH MG: 1 TABLET ORAL at 19:59

## 2021-06-02 RX ADMIN — LEVOTHYROXINE SODIUM SCH MCG: 50 TABLET ORAL at 09:41

## 2021-06-02 RX ADMIN — BENZTROPINE MESYLATE SCH MG: 1 TABLET ORAL at 09:37

## 2021-06-02 RX ADMIN — ENOXAPARIN SODIUM SCH: 40 INJECTION SUBCUTANEOUS at 09:42

## 2021-06-02 NOTE — P.PN
Progress Note - Text


Progress Note Date: 06/02/21


Presenting complaint:


fatigue





Hospital course


Patient is a 65 -year-old male with a history of schizophrenia, dementia, 

Parkinson's, and seizure disorder who presented as a transfer from [facility] secondary to COVID-19 infection.  He initially presented there 

secondary to poor oral intake and lethargy.  He was found to be COVID-19 

positive, hyponatremic, and had lactic acidosis. His CXR showed no acute 

process. CT ea showed no acute pathology.  He was transferred here and started 

on IVF. His legal guardian has decided on hospice care and awaiting transfer to 

hospice house. 


Today: Spoke to the nurse.  Patient actually ate some pancakes for breakfast 

today.  His communicating a bit.  Tired.





Review of systems: Attempted for constitutional, cardiovascular, GI, pulmonary. 

relevant finding as above





Active Medications





Acetaminophen (Acetaminophen Tab 325 Mg Tab)  650 mg PO Q6H PRN


   PRN Reason: Mild Pain or Fever > 100.5


Benztropine Mesylate (Benztropine Mesylate 1 Mg Tab)  2 mg PO BID@0700,1900 [facility]


   Last Admin: 06/02/21 19:59 Dose:  2 mg


   Documented by: 


Bethanechol Chloride (Bethanechol 10 Mg Tab)  10 mg PO TID@0700,1500,1900 [facility]


   Last Admin: 06/02/21 20:01 Dose:  10 mg


   Documented by: 


Chlorpromazine HCl (Chlorpromazine 25 Mg Tab)  50 mg PO TID@0700,1500,1900 [facility]


   Last Admin: 06/02/21 20:00 Dose:  50 mg


   Documented by: 


Cholecalciferol (Cholecalciferol 25 Mcg (1000 Iu) Tablet)  50 mcg PO DAILY@0700 

[facility]


   Last Admin: 06/02/21 09:38 Dose:  50 mcg


   Documented by: 


Enoxaparin Sodium (Enoxaparin 40 Mg/0.4 Ml Syringe)  40 mg SQ DAILY [facility]


   Last Admin: 06/02/21 09:42 Dose:  Not Given


   Documented by: 


Etodolac (Etodolac 400 Mg Tab)  400 mg PO TID PRN


   PRN Reason: Pain


Famotidine (Famotidine 20 Mg Tab)  20 mg PO BID [facility]


   Last Admin: 06/02/21 20:00 Dose:  20 mg


   Documented by: 


Hydroxyzine Pamoate (Hydroxyzine Pamoate 25 Mg Cap)  50 mg PO Q6H PRN


   PRN Reason: ANXIETY ATTACKS


Lacosamide (Lacosamide 50 Mg Tablet)  200 mg PO BID@0700,1900 [facility]


   Last Admin: 06/02/21 20:01 Dose:  200 mg


   Documented by: 


Lamotrigine (Lamotrigine 100 Mg Tab)  200 mg PO TID@0700,1500,1900 [facility]


   Last Admin: 06/02/21 19:59 Dose:  200 mg


   Documented by: 


Levothyroxine Sodium (Levothyroxine 50 Mcg Tab)  50 mcg PO DAILY@0700 [facility]


   Last Admin: 06/02/21 09:41 Dose:  50 mcg


   Documented by: 


Melatonin (Melatonin 3 Mg Tablet)  3 mg PO HS@1900 [facility]


   Last Admin: 06/02/21 20:01 Dose:  3 mg


   Documented by: 


Multivitamins (Multivitamins, Thera 1 Each Tab)  1 each PO DAILY@0700 [facility]


   Last Admin: 06/02/21 09:41 Dose:  1 each


   Documented by: 


Naloxone HCl (Naloxone 0.4 Mg/Ml 1 Ml Vial)  0.2 mg IV Q2M PRN


   PRN Reason: Opioid Reversal


Tamsulosin HCl (Tamsulosin 0.4 Mg Cap.Er.24h)  0.4 mg PO DAILY@0700 [facility]


   Last Admin: 06/02/21 09:41 Dose:  0.4 mg


   Documented by: 








Vitals: 98.5, 91, 16, 105/62, 97% room air


General:  no distress, lethargic, thin, temporal wasting


Eyes: Pupils equal, pale conjunctiva


Rest of the exam as per nursing





Investigations:


 WBC 13.4 hemoglobin 10.4 platelets 192 potassium 4.1 creatinine 0.7 albumin 3.2





Assessment and plan:





COVID-19 positive


- Patient has not been hypoxic and therefore does not meet criteria for 

dexamethasone, Remdesivir, or other treatments.


- Lovenox, Vit D 





Schizophrenia with extra paramadial side effects 


- thorazine, Cogentin, vistaril





Major cognitive impairment, from dementia





Sizure disorder


- vimpat, laictal 





Hypothyroidism


- synthroid 





Hyponatremia, dehydration-improved





Acute on chronic medical debility


Fall precautions





No code














Discussed with Abida the .  Pending paperwork clearance from [facility].  Continue current medication treatment plan.

## 2021-06-03 VITALS — HEART RATE: 75 BPM | TEMPERATURE: 98.3 F | DIASTOLIC BLOOD PRESSURE: 61 MMHG | SYSTOLIC BLOOD PRESSURE: 123 MMHG

## 2021-06-03 VITALS — RESPIRATION RATE: 16 BRPM

## 2021-06-03 RX ADMIN — LEVOTHYROXINE SODIUM SCH: 50 TABLET ORAL at 09:54

## 2021-06-03 RX ADMIN — THERA TABS SCH: TAB at 09:54

## 2021-06-03 RX ADMIN — LACOSAMIDE SCH: 50 TABLET, FILM COATED ORAL at 09:53

## 2021-06-03 RX ADMIN — TAMSULOSIN HYDROCHLORIDE SCH: 0.4 CAPSULE ORAL at 09:54

## 2021-06-03 RX ADMIN — BETHANECHOL CHLORIDE SCH: 10 TABLET ORAL at 09:53

## 2021-06-03 RX ADMIN — Medication SCH: at 09:53

## 2021-06-03 RX ADMIN — FAMOTIDINE SCH: 20 TABLET, FILM COATED ORAL at 09:54

## 2021-06-03 RX ADMIN — BENZTROPINE MESYLATE SCH: 1 TABLET ORAL at 09:53

## 2021-06-03 RX ADMIN — ENOXAPARIN SODIUM SCH: 40 INJECTION SUBCUTANEOUS at 09:54

## 2021-06-03 NOTE — P.DS
Providers


Date of admission: 


05/28/21 00:58





Expected date of discharge: 06/03/21


Attending physician: 


Amando Liang





Primary care physician: 


[facility] Course: 





Presenting complaint:


fatigue





Hospital course


Patient is a 65 -year-old male with a history of schizophrenia, dementia, 

Parkinson's, and seizure disorder who presented as a transfer from [facility] secondary to COVID-19 infection.  He initially presented there 

secondary to poor oral intake and lethargy.  He was found to be COVID-19 

positive, hyponatremic, and had lactic acidosis. His CXR showed no acute 

process. CT ea showed no acute pathology.  He was transferred here and started 

on IVF. His legal guardian has decided on hospice care and awaiting transfer to 

[facility]. 


Today: Eating a bit.  Answering questions.  Tired.  Patient related to the 

[facility] later today.











Vitals: 98.3, 75, 16, 1 23 x 61, 97% room air


General:  no distress, lethargic, thin, temporal wasting


Eyes: Pupils equal, pale conjunctiva


Psychiatry: Answering simple questions


Neurological: Moving all 4 limbs.  No facial asymmetry





Rest of the exam as per nursing





Investigations:


 WBC 13.4 hemoglobin 10.4 platelets 192 potassium 4.1 creatinine 0.7 albumin 3.2





Assessment and plan:





COVID-19 positive


- Patient has not been hypoxic and therefore does not meet criteria for 

dexamethasone, Remdesivir, or other treatments.


- Lovenox, Vit D 





Schizophrenia with extra paramadial side effects 


- thorazine, Cogentin, vistaril





Major cognitive impairment, from dementia





Sizure disorder


- vimpat, laictal 





Hypothyroidism


- synthroid 





Hyponatremia, dehydration-improved





Acute on chronic medical debility


Fall precautions





No code








Disposition:


Hospice at [facility]








Plan - Discharge Summary


New Discharge Prescriptions: 


Continue


   hydrOXYzine pamoate [Vistaril] 50 mg PO Q6H PRN


     PRN Reason: ANXIETY ATTACKS


   Famotidine [Pepcid] 20 mg PO DAILY@0700


   Bethanechol [Urecholine] 10 mg PO TID@0700,1500,1900


   Acetaminophen Tab [Tylenol] 650 mg PO Q6H PRN


     PRN Reason: Pain Or Fever > 100.5


   lamoTRIgine [LaMICtal] 200 mg PO TID@0700,1500,1900


   Tamsulosin HCl [Flomax] 0.4 mg PO DAILY@0700


   Benztropine Mesylate [Cogentin] 2 mg PO BID@0700,1900


   Melatonin 3 mg PO HS@1900


   Ketorolac [Toradol] 10 mg PO Q6HR PRN


     PRN Reason: Pain


   Lacosamide [Vimpat] 200 mg PO BID@0700,1900


   chlorproMAZINE HCL [Thorazine] 50 mg PO TID@0700,1500,1900


   Levothyroxine Sodium [Synthroid] 50 mcg PO DAILY@0700





Discontinued


   Multivits,Th W-Ca,Fe,Oth Min [Therapeutic M] 1 tab PO DAILY@0700


   Cholecalciferol [Vitamin D3 (25 Mcg = 1000 Iu)] 50 mcg PO DAILY@0700


Discharge Medication List





Acetaminophen Tab [Tylenol] 650 mg PO Q6H PRN 05/28/21 [History]


Benztropine Mesylate [Cogentin] 2 mg PO BID@0700,1900 05/28/21 [History]


Bethanechol [Urecholine] 10 mg PO TID@0700,1500,1900 05/28/21 [History]


Famotidine [Pepcid] 20 mg PO DAILY@0700 05/28/21 [History]


Ketorolac [Toradol] 10 mg PO Q6HR PRN 05/28/21 [History]


Lacosamide [Vimpat] 200 mg PO BID@0700,1900 05/28/21 [History]


Levothyroxine Sodium [Synthroid] 50 mcg PO DAILY@0700 05/28/21 [History]


Melatonin 3 mg PO HS@1900 05/28/21 [History]


Tamsulosin HCl [Flomax] 0.4 mg PO DAILY@0700 05/28/21 [History]


chlorproMAZINE HCL [Thorazine] 50 mg PO TID@0700,1500,1900 05/28/21 [History]


hydrOXYzine pamoate [Vistaril] 50 mg PO Q6H PRN 05/28/21 [History]


lamoTRIgine [LaMICtal] 200 mg PO TID@0700,1500,1900 05/28/21 [History]








Follow up Appointment(s)/Referral(s): 


[facility] [REFERRING] - As Needed


Mark Major MD [Primary Care Provider] - 1-2 days


Patient Instructions/Handouts:  Coronavirus Disease 2019 (COVID-19), Hospice 

(DC)


Discharge Disposition: DISCH TO HOSPICE MED FACILTY